# Patient Record
Sex: FEMALE | URBAN - METROPOLITAN AREA
[De-identification: names, ages, dates, MRNs, and addresses within clinical notes are randomized per-mention and may not be internally consistent; named-entity substitution may affect disease eponyms.]

---

## 2019-11-13 ENCOUNTER — OFFICE VISIT (OUTPATIENT)
Dept: URBAN - METROPOLITAN AREA CLINIC 23 | Facility: CLINIC | Age: 69
End: 2019-11-13
Payer: MEDICARE

## 2019-11-13 DIAGNOSIS — H43.20 CRYSTALLINE DEPOSITS IN VITREOUS BODY, UNSPECIFIED EYE: ICD-10-CM

## 2019-11-13 PROCEDURE — 92004 COMPRE OPH EXAM NEW PT 1/>: CPT | Performed by: OPTOMETRIST

## 2019-11-13 PROCEDURE — 92134 CPTRZ OPH DX IMG PST SGM RTA: CPT | Performed by: OPTOMETRIST

## 2019-11-13 ASSESSMENT — KERATOMETRY
OD: 44.00
OS: 45.25

## 2019-11-13 ASSESSMENT — INTRAOCULAR PRESSURE
OD: 15
OS: 13

## 2019-11-13 NOTE — IMPRESSION/PLAN
Impression: Type 2 diabetes mellitus without complications: R11.9. Plan: Discussed diagnosis in detail with patient. Advised and emphasized patient of blood sugar control. Discussed risks of progression. Poor compliance can lead to blindness. OCT macula performed and reviewed with patient today- no signs of diabetic retinopathy. Reassured patient of condition and treatment. Will continue to observe condition and or symptoms.

## 2019-11-13 NOTE — IMPRESSION/PLAN
Impression: Crystalline deposits in vitreous body, unspecified eye: H43.20. Plan: Discussed diagnosis in detail with patient. No treatment is required at this time. Reassured patient of current condition and treatment. Will continue to observe condition and or symptoms.

## 2020-06-09 ENCOUNTER — OFFICE VISIT (OUTPATIENT)
Dept: URBAN - METROPOLITAN AREA CLINIC 22 | Facility: CLINIC | Age: 70
End: 2020-06-09
Payer: MEDICARE

## 2020-06-09 DIAGNOSIS — E11.9 TYPE 2 DIABETES MELLITUS W/O COMPLICATION: Primary | ICD-10-CM

## 2020-06-09 DIAGNOSIS — H25.13 AGE-RELATED NUCLEAR CATARACT, BILATERAL: ICD-10-CM

## 2020-06-09 DIAGNOSIS — H52.4 PRESBYOPIA: ICD-10-CM

## 2020-06-09 PROCEDURE — 92014 COMPRE OPH EXAM EST PT 1/>: CPT | Performed by: OPTOMETRIST

## 2020-06-09 ASSESSMENT — INTRAOCULAR PRESSURE
OD: 15
OS: 18

## 2020-06-09 ASSESSMENT — VISUAL ACUITY
OS: 20/20
OD: 20/20

## 2020-06-09 NOTE — IMPRESSION/PLAN
Impression: Age-related nuclear cataract, bilateral: H25.13. OU. Plan: Discussed diagnosis in detail with patient. No treatment is required at this time. Will continue to monitor.

## 2020-06-09 NOTE — IMPRESSION/PLAN
Impression: Type 2 diabetes mellitus w/o complication: W08.2. OU. Plan: RTC 1 year for [address]. Recommend continued BS monitoring and scheduled visits with PCP/ENDO.